# Patient Record
Sex: MALE | Race: WHITE
[De-identification: names, ages, dates, MRNs, and addresses within clinical notes are randomized per-mention and may not be internally consistent; named-entity substitution may affect disease eponyms.]

---

## 2017-09-25 ENCOUNTER — HOSPITAL ENCOUNTER (OUTPATIENT)
Dept: HOSPITAL 92 - SDC | Age: 27
Discharge: HOME | End: 2017-09-25
Attending: NEUROLOGICAL SURGERY
Payer: COMMERCIAL

## 2017-09-25 VITALS — BODY MASS INDEX: 40.4 KG/M2

## 2017-09-25 DIAGNOSIS — Z98.890: ICD-10-CM

## 2017-09-25 DIAGNOSIS — Z88.1: ICD-10-CM

## 2017-09-25 DIAGNOSIS — E66.01: ICD-10-CM

## 2017-09-25 DIAGNOSIS — M54.16: Primary | ICD-10-CM

## 2017-09-25 PROCEDURE — 00NY0ZZ RELEASE LUMBAR SPINAL CORD, OPEN APPROACH: ICD-10-PCS | Performed by: NEUROLOGICAL SURGERY

## 2017-09-25 PROCEDURE — 96374 THER/PROPH/DIAG INJ IV PUSH: CPT

## 2017-09-25 PROCEDURE — 76001: CPT

## 2017-09-25 NOTE — OP
DATE OF PROCEDURE:  09/25/2017

 

SURGEON:  Johnathon Lozano M.D.

 

FIRST ASSISTANT:  Wes Nation PA-C

 

INDICATION:  Pain.

 

DIAGNOSIS:  Lumbar radiculopathy.

 

PROCEDURE PERFORMED:  Left L5 discectomy.

 

ANESTHESIA:  General.

 

TECHNIQUE:  The patient was brought into the operating room and placed under general anesthesia.  He
 was flipped from a supine to a prone position on the operating room table.  A linear incision was p
lanned over the L5 segment.  After prepping and draping and after an appropriate operative pause, th
e incision was created.  The soft tissues were swept left of midline.  Due to the patient's morbid o
besity, a 22 modifier will be applied to the case due to increased difficulty, at least 50% increase
d in operative time.  After identifying the appropriate level of C-arm fluoroscopy, 2, 3 and 4 mm Ke
rrisons were used to perform laminectomy along the inferior aspect of L5 and the superior aspect of 
S1 on the left.  The descending S1 nerve root was identified and mobilized medially with a nerve stephanie
t retractor.  An annulotomy was performed in the L5 disc space.  All disc material was removed and t
he descending S1 nerve root was removed.  After complete decompression, the wound was irrigated.  He
mostasis was maintained throughout.  The wound was then closed in anatomic layers and a pressure claritza
ssing was applied.  There were no known procedural complications.

## 2018-05-14 ENCOUNTER — HOSPITAL ENCOUNTER (OUTPATIENT)
Dept: HOSPITAL 92 - BICMRI | Age: 28
Discharge: HOME | End: 2018-05-14
Attending: PHYSICAL MEDICINE & REHABILITATION
Payer: COMMERCIAL

## 2018-05-14 DIAGNOSIS — M51.36: ICD-10-CM

## 2018-05-14 DIAGNOSIS — Z98.890: ICD-10-CM

## 2018-05-14 DIAGNOSIS — M47.896: Primary | ICD-10-CM

## 2018-05-14 DIAGNOSIS — M47.897: ICD-10-CM

## 2018-05-14 PROCEDURE — A9579 GAD-BASE MR CONTRAST NOS,1ML: HCPCS

## 2018-05-14 PROCEDURE — 72158 MRI LUMBAR SPINE W/O & W/DYE: CPT

## 2018-09-24 ENCOUNTER — HOSPITAL ENCOUNTER (OUTPATIENT)
Dept: HOSPITAL 92 - BICRAD | Age: 28
Discharge: HOME | End: 2018-09-24
Attending: PHYSICAL MEDICINE & REHABILITATION
Payer: COMMERCIAL

## 2018-09-24 DIAGNOSIS — M54.5: Primary | ICD-10-CM

## 2018-09-24 DIAGNOSIS — M47.816: ICD-10-CM

## 2018-09-24 PROCEDURE — 72110 X-RAY EXAM L-2 SPINE 4/>VWS: CPT

## 2018-11-06 ENCOUNTER — HOSPITAL ENCOUNTER (OUTPATIENT)
Dept: HOSPITAL 92 - TBSIIMAG | Age: 28
Discharge: HOME | End: 2018-11-06
Attending: NEUROLOGICAL SURGERY
Payer: COMMERCIAL

## 2018-11-06 DIAGNOSIS — M54.5: Primary | ICD-10-CM

## 2018-11-06 DIAGNOSIS — M99.83: ICD-10-CM

## 2018-11-06 DIAGNOSIS — M48.061: ICD-10-CM

## 2018-11-06 DIAGNOSIS — M51.27: ICD-10-CM

## 2018-11-06 DIAGNOSIS — M48.07: ICD-10-CM

## 2018-11-06 PROCEDURE — 72131 CT LUMBAR SPINE W/O DYE: CPT

## 2018-11-06 NOTE — CT
CT LUMBAR SPINE WITHOUT IV CONTRAST:

 

HISTORY:

A 28-year-old male with a history of low back pain and a history of surgery one year ago, still havin
g pain.

 

FINDINGS:

Lumbar spine CT demonstrates no evidence for acute fracture or dislocation.

 

At L1-L2 and L2-L3, there is no focal herniation or significant canal or foraminal stenosis.

 

L3-L4:  There is mild disk bulging with mild central canal and lateral recess stenosis without signif
icant foraminal stenosis.

 

L4-L5:  There is moderate diffuse disk bulging, somewhat asymmetrically prominent in the left postero
lateral aspect, with moderate central canal and lateral recess stenosis and mild foraminal stenosis.

 

L5-S1:  Large central protrusion with marked thecal sac compression and canal and lateral recess sten
osis with mild foraminal stenosis.

 

IMPRESSION:

1.  Large protrusion at L5-S1 with marked canal and lateral recess stenosis.

 

2.  Moderate central canal and lateral recess stenosis at L4-L5.

 

3.  Mild canal and lateral recess stenosis at L3-L4.

 

POS: LUCIEN